# Patient Record
Sex: MALE | Race: WHITE | NOT HISPANIC OR LATINO | Employment: UNEMPLOYED | URBAN - METROPOLITAN AREA
[De-identification: names, ages, dates, MRNs, and addresses within clinical notes are randomized per-mention and may not be internally consistent; named-entity substitution may affect disease eponyms.]

---

## 2017-03-19 ENCOUNTER — OFFICE VISIT (OUTPATIENT)
Dept: URGENT CARE | Facility: PHYSICIAN GROUP | Age: 22
End: 2017-03-19
Payer: COMMERCIAL

## 2017-03-19 VITALS
HEART RATE: 72 BPM | HEIGHT: 68 IN | SYSTOLIC BLOOD PRESSURE: 110 MMHG | DIASTOLIC BLOOD PRESSURE: 70 MMHG | TEMPERATURE: 98.9 F | BODY MASS INDEX: 18.94 KG/M2 | OXYGEN SATURATION: 100 % | WEIGHT: 125 LBS

## 2017-03-19 DIAGNOSIS — Z56.6 STRESSFUL JOB: ICD-10-CM

## 2017-03-19 DIAGNOSIS — F15.10 METHAMPHETAMINE ABUSE (HCC): ICD-10-CM

## 2017-03-19 DIAGNOSIS — R11.14 BILIOUS VOMITING WITH NAUSEA: Primary | ICD-10-CM

## 2017-03-19 PROCEDURE — 99204 OFFICE O/P NEW MOD 45 MIN: CPT | Performed by: PHYSICIAN ASSISTANT

## 2017-03-19 RX ORDER — PROMETHAZINE HYDROCHLORIDE 25 MG/1
25 TABLET ORAL EVERY 6 HOURS PRN
Qty: 30 TAB | Refills: 0 | Status: SHIPPED | OUTPATIENT
Start: 2017-03-19 | End: 2022-12-29

## 2017-03-19 SDOH — HEALTH STABILITY - MENTAL HEALTH: OTHER PHYSICAL AND MENTAL STRAIN RELATED TO WORK: Z56.6

## 2017-03-19 ASSESSMENT — ENCOUNTER SYMPTOMS: STRESS: 1

## 2017-03-19 NOTE — MR AVS SNAPSHOT
"        Joshua Duron   3/19/2017 3:50 PM   Office Visit   MRN: [id number]    Department:  [facility]   Dept Phone:  [phone]    Description:  Male : 1995   Provider:  Juan Carlos Prakash PA-C           Reason for Visit     Stress           Allergies as of 3/19/2017     No Known Allergies      You were diagnosed with     Bilious vomiting with nausea   [1727137]  -  Primary     Stressful job   [324831]       Methamphetamine abuse   [909023]         Vital Signs     Blood Pressure Pulse Temperature Height Weight Body Mass Index    110/70 mmHg 72 37.2 °C (98.9 °F) 1.727 m (5' 8\") 56.7 kg (125 lb) 19.01 kg/m2    Oxygen Saturation                   100%           Basic Information     Date Of Birth Sex Race Ethnicity Preferred Language    1995 Male White Non- English      Health Maintenance     Patient has no pending health maintenance at this time      Current Immunizations     No immunizations on file.      Below and/or attached are the medications your provider expects you to take. Review all of your home medications and newly ordered medications with your provider and/or pharmacist. Follow medication instructions as directed by your provider and/or pharmacist. Please keep your medication list with you and share with your provider. Update the information when medications are discontinued, doses are changed, or new medications (including over-the-counter products) are added; and carry medication information at all times in the event of emergency situations     Allergies:  No Known Allergies          Medications  Valid as of: 2017 -  4:12 PM    Generic Name Brand Name Tablet Size Instructions for use    Promethazine HCl (Tab) PHENERGAN 25 MG Take 1 Tab by mouth every 6 hours as needed for Nausea/Vomiting.        .                 Medicines prescribed today were sent to:     [id number] - [place], [place] - [address]    [address] [place] [address]   " "Phone: [phone] Fax: [phone]    Open 24 Hours?: No      Medication refill instructions:       If your prescription bottle indicates you have medication refills left, it is not necessary to call your provider’s office. Please contact your pharmacy and they will refill your medication.    If your prescription bottle indicates you do not have any refills left, you may request refills at any time through one of the following ways: The online [employer] system (except Urgent Care), by calling your provider’s office, or by asking your pharmacy to contact your provider’s office with a refill request. Medication refills are processed only during regular business hours and may not be available until the next business day. Your provider may request additional information or to have a follow-up visit with you prior to refilling your medication.   *Please Note: Medication refills are assigned a new Rx number when refilled electronically. Your pharmacy may indicate that no refills were authorized even though a new prescription for the same medication is available at the pharmacy. Please request the medicine by name with the pharmacy before contacting your provider for a refill.        Referral     A referral request has been sent to our patient care coordination department. Please allow 3-5 business days for us to process this request and contact you either by phone or mail. If you do not hear from us by the 5th business day, please call us at [phone].        Instructions    Stress  Stress-related medical problems are becoming increasingly common.  The body has a built-in physical response to stressful situations. Faced with pressure, challenge or danger, we need to react quickly. Our bodies release hormones such as cortisol and adrenaline to help do this. These hormones are part of the \"fight or flight\" response and affect the metabolic rate, heart rate and blood pressure, resulting in a heightened, stressed state " that prepares the body for optimum performance in dealing with a stressful situation.  It is likely that early man required these mechanisms to stay alive, but usually modern stresses do not call for this, and the same hormones released in today's world can damage health and reduce coping ability.  CAUSES  · Pressure to perform at work, at school or in sports.  · Threats of physical violence.  · Money worries.  · Arguments.  · Family conflicts.  · Divorce or separation from significant other.  · Bereavement.  · New job or unemployment.  · Changes in location.  · Alcohol or drug abuse.  SOMETIMES, THERE IS NO PARTICULAR REASON FOR DEVELOPING STRESS.  Almost all people are at risk of being stressed at some time in their lives. It is important to know that some stress is temporary and some is long term.  · Temporary stress will go away when a situation is resolved. Most people can cope with short periods of stress, and it can often be relieved by relaxing, taking a walk, chatting through issues with friends, or having a good night's sleep.  · Chronic (long-term, continuous) stress is much harder to deal with. It can be psychologically and emotionally damaging. It can be harmful both for an individual and for friends and family.  SYMPTOMS  Everyone reacts to stress differently. There are some common effects that help us recognize it. In times of extreme stress, people may:  · Shake uncontrollably.  · Breathe faster and deeper than normal (hyperventilate).  · Vomit.  · For people with asthma, stress can trigger an attack.  · For some people, stress may trigger migraine headaches, ulcers, and body pain.  PHYSICAL EFFECTS OF STRESS MAY INCLUDE:  · Loss of energy.  · Skin problems.  · Aches and pains resulting from tense muscles, including neck ache, backache and tension headaches.  · Increased pain from arthritis and other conditions.  · Irregular heart beat (palpitations).  · Periods of irritability or anger.  · Apathy or  depression.  · Anxiety (feeling uptight or worrying).  · Unusual behavior.  · Loss of appetite.  · Comfort eating.  · Lack of concentration.  · Loss of, or decreased, sex-drive.  · Increased smoking, drinking, or recreational drug use.  · For women, missed periods.  · Ulcers, joint pain, and muscle pain.  Post-traumatic stress is the stress caused by any serious accident, strong emotional damage, or extremely difficult or violent experience such as rape or war.  Post-traumatic stress victims can experience mixtures of emotions such as fear, shame, depression, guilt or anger. It may include recurrent memories or images that may be haunting. These feelings can last for weeks, months or even years after the traumatic event that triggered them. Specialized treatment, possibly with medicines and psychological therapies, is available.  If stress is causing physical symptoms, severe distress or making it difficult for you to function as normal, it is worth seeing your caregiver. It is important to remember that although stress is a usual part of life, extreme or prolonged stress can lead to other illnesses that will need treatment. It is better to visit a doctor sooner rather than later. Stress has been linked to the development of high blood pressure and heart disease, as well as insomnia and depression.  There is no diagnostic test for stress since everyone reacts to it differently. But a caregiver will be able to spot the physical symptoms, such as:  · Headaches.  · Shingles.  · Ulcers.  Emotional distress such as intense worry, low mood or irritability should be detected when the doctor asks pertinent questions to identify any underlying problems that might be the cause. In case there are physical reasons for the symptoms, the doctor may also want to do some tests to exclude certain conditions.  If you feel that you are suffering from stress, try to identify the aspects of your life that are causing it. Sometimes you  may not be able to change or avoid them, but even a small change can have a positive ripple effect. A simple lifestyle change can make all the difference.  STRATEGIES THAT CAN HELP DEAL WITH STRESS:  · Delegating or sharing responsibilities.  · Avoiding confrontations.  · Learning to be more assertive.  · Regular exercise.  · Avoid using alcohol or street drugs to cope.  · Eating a healthy, balanced diet, rich in fruit and vegetables and proteins.  · Finding humor or absurdity in stressful situations.  · Never taking on more than you know you can handle comfortably.  · Organizing your time better to get as much done as possible.  · Talking to friends or family and sharing your thoughts and fears.  · Listening to music or relaxation tapes.  · Tensing and then relaxing your muscles, starting at the toes and working up to the head and neck.  If you think that you would benefit from help, either in identifying the things that are causing your stress or in learning techniques to help you relax, see a caregiver who is capable of helping you with this. Rather than relying on medications, it is usually better to try and identify the things in your life that are causing stress and try to deal with them.  There are many techniques of managing stress including counseling, psychotherapy, aromatherapy, yoga, and exercise. Your caregiver can help you determine what is best for you.  Document Released: 03/09/2004 Document Revised: 03/11/2013 Document Reviewed: 02/03/2009  ExitCare® Patient Information ©2014 [employer].            Bolster Access Code: [email or web address]  Expires: 4/18/2017  4:05 PM    Your email address is not on file at [facility].  Email Addresses are required for you to sign up for Bolster, please contact [phone] to verify your personal information and to provide your email address prior to attempting to register for Bolster.    Joshua Duron  [address] [place] [address]    Nova  A  secure, online tool to manage your health information     [employer]’s NTQ-Data® is a secure, online tool that connects you to your personalized health information from the privacy of your home -- day or night - making it very easy for you to manage your healthcare. Once the activation process is completed, you can even access your medical information using the Love[employer] macy, which is available for free in the Apple Macy store or Google Play store.     To learn more about NTQ-Data, visit [email or web address]    There are two levels of access available (as shown below):   My Chart Features  [facility] Primary Care Doctor [facility]  Specialists [facility]  Urgent  Care Non-[facility] Primary Care Doctor   Email your healthcare team securely and privately 24/7 X X X    Manage appointments: schedule your next appointment; view details of past/upcoming appointments X      Request prescription refills. X      View recent personal medical records, including lab and immunizations X X X X   View health record, including health history, allergies, medications X X X X   Read reports about your outpatient visits, procedures, consult and ER notes X X X X   See your discharge summary, which is a recap of your hospital and/or ER visit that includes your diagnosis, lab results, and care plan X X  X     How to register for NTQ-Data:  Once your e-mail address has been verified, follow the following steps to sign up for NTQ-Data.     1. Go to  [email or web address]  2. Click on the Sign Up Now box, which takes you to the New Member Sign Up page. You will need to provide the following information:  a. Enter your NTQ-Data Access Code exactly as it appears at the top of this page. (You will not need to use this code after you’ve completed the sign-up process. If you do not sign up before the expiration date, you must request a new code.)   b. Enter your date of birth.   c. Enter your home email address.   d. Click Submit, and follow the next screen’s  instructions.  3. Create a [employer] ID. This will be your [employer] login ID and cannot be changed, so think of one that is secure and easy to remember.  4. Create a [employer] password. You can change your password at any time.  5. Enter your Password Reset Question and Answer. This can be used at a later time if you forget your password.   6. Enter your e-mail address. This allows you to receive e-mail notifications when new information is available in [employer].  7. Click Sign Up. You can now view your health information.    For assistance activating your [employer] account, call [phone]

## 2017-03-19 NOTE — PATIENT INSTRUCTIONS
"Stress  Stress-related medical problems are becoming increasingly common.  The body has a built-in physical response to stressful situations. Faced with pressure, challenge or danger, we need to react quickly. Our bodies release hormones such as cortisol and adrenaline to help do this. These hormones are part of the \"fight or flight\" response and affect the metabolic rate, heart rate and blood pressure, resulting in a heightened, stressed state that prepares the body for optimum performance in dealing with a stressful situation.  It is likely that early man required these mechanisms to stay alive, but usually modern stresses do not call for this, and the same hormones released in today's world can damage health and reduce coping ability.  CAUSES  · Pressure to perform at work, at school or in sports.  · Threats of physical violence.  · Money worries.  · Arguments.  · Family conflicts.  · Divorce or separation from significant other.  · Bereavement.  · New job or unemployment.  · Changes in location.  · Alcohol or drug abuse.  SOMETIMES, THERE IS NO PARTICULAR REASON FOR DEVELOPING STRESS.  Almost all people are at risk of being stressed at some time in their lives. It is important to know that some stress is temporary and some is long term.  · Temporary stress will go away when a situation is resolved. Most people can cope with short periods of stress, and it can often be relieved by relaxing, taking a walk, chatting through issues with friends, or having a good night's sleep.  · Chronic (long-term, continuous) stress is much harder to deal with. It can be psychologically and emotionally damaging. It can be harmful both for an individual and for friends and family.  SYMPTOMS  Everyone reacts to stress differently. There are some common effects that help us recognize it. In times of extreme stress, people may:  · Shake uncontrollably.  · Breathe faster and deeper than normal (hyperventilate).  · Vomit.  · For people " with asthma, stress can trigger an attack.  · For some people, stress may trigger migraine headaches, ulcers, and body pain.  PHYSICAL EFFECTS OF STRESS MAY INCLUDE:  · Loss of energy.  · Skin problems.  · Aches and pains resulting from tense muscles, including neck ache, backache and tension headaches.  · Increased pain from arthritis and other conditions.  · Irregular heart beat (palpitations).  · Periods of irritability or anger.  · Apathy or depression.  · Anxiety (feeling uptight or worrying).  · Unusual behavior.  · Loss of appetite.  · Comfort eating.  · Lack of concentration.  · Loss of, or decreased, sex-drive.  · Increased smoking, drinking, or recreational drug use.  · For women, missed periods.  · Ulcers, joint pain, and muscle pain.  Post-traumatic stress is the stress caused by any serious accident, strong emotional damage, or extremely difficult or violent experience such as rape or war.  Post-traumatic stress victims can experience mixtures of emotions such as fear, shame, depression, guilt or anger. It may include recurrent memories or images that may be haunting. These feelings can last for weeks, months or even years after the traumatic event that triggered them. Specialized treatment, possibly with medicines and psychological therapies, is available.  If stress is causing physical symptoms, severe distress or making it difficult for you to function as normal, it is worth seeing your caregiver. It is important to remember that although stress is a usual part of life, extreme or prolonged stress can lead to other illnesses that will need treatment. It is better to visit a doctor sooner rather than later. Stress has been linked to the development of high blood pressure and heart disease, as well as insomnia and depression.  There is no diagnostic test for stress since everyone reacts to it differently. But a caregiver will be able to spot the physical symptoms, such  as:  · Headaches.  · Shingles.  · Ulcers.  Emotional distress such as intense worry, low mood or irritability should be detected when the doctor asks pertinent questions to identify any underlying problems that might be the cause. In case there are physical reasons for the symptoms, the doctor may also want to do some tests to exclude certain conditions.  If you feel that you are suffering from stress, try to identify the aspects of your life that are causing it. Sometimes you may not be able to change or avoid them, but even a small change can have a positive ripple effect. A simple lifestyle change can make all the difference.  STRATEGIES THAT CAN HELP DEAL WITH STRESS:  · Delegating or sharing responsibilities.  · Avoiding confrontations.  · Learning to be more assertive.  · Regular exercise.  · Avoid using alcohol or street drugs to cope.  · Eating a healthy, balanced diet, rich in fruit and vegetables and proteins.  · Finding humor or absurdity in stressful situations.  · Never taking on more than you know you can handle comfortably.  · Organizing your time better to get as much done as possible.  · Talking to friends or family and sharing your thoughts and fears.  · Listening to music or relaxation tapes.  · Tensing and then relaxing your muscles, starting at the toes and working up to the head and neck.  If you think that you would benefit from help, either in identifying the things that are causing your stress or in learning techniques to help you relax, see a caregiver who is capable of helping you with this. Rather than relying on medications, it is usually better to try and identify the things in your life that are causing stress and try to deal with them.  There are many techniques of managing stress including counseling, psychotherapy, aromatherapy, yoga, and exercise. Your caregiver can help you determine what is best for you.  Document Released: 03/09/2004 Document Revised: 03/11/2013 Document  Reviewed: 02/03/2009  ExitCare® Patient Information ©2014 [employer].

## 2017-03-20 NOTE — PROGRESS NOTES
Subjective:      Pt is a 21 y.o. 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 Pt states he abuses METH to cope and took 325 mg this morning with a whole bottle of Elizabeth Quick chocolate syrup and has had NV. Pt has not taken any Rx medications for this condition. Pt states the pain is a 7/10, aching in nature and worse at night. Pt denies CP, SOB, diarrhea, paresthesias, headaches, dizziness, change in vision, hives, or other joint pain. The pt's medication list, problem list, and allergies have been evaluated and reviewed during today's visit.    PMH:  Negative per pt.      PSH:  Negative per pt.      Fam Hx:  Father with hx of HTN      Soc HX:  Social History     Social History   • Marital Status: Single     Spouse Name: N/A   • Number of Children: N/A   • Years of Education: N/A     Occupational History   • Not on file.     Social History Main Topics   • Smoking status: Not on file   • Smokeless tobacco: Not on file   • Alcohol Use: Not on file   • Drug Use: Not on file   • Sexual Activity: Not on file     Other Topics Concern   • Not on file     Social History Narrative   • No narrative on file         Medications:    Current outpatient prescriptions:   •  promethazine (PHENERGAN) 25 MG Tab, Take 1 Tab by mouth every 6 hours as needed for Nausea/Vomiting., Disp: 30 Tab, Rfl: 0      Allergies:  Review of patient's allergies indicates no known allergies.        ROS    Constitutional: Negative for fever, chills and malaise/fatigue. +Meth abuse  HENT: Negative for congestion and sore throat.    Eyes: Negative for blurred vision, double vision and photophobia.   Respiratory: Negative for cough and shortness of breath.    Cardiovascular: Negative for chest pain and palpitations.   Gastrointestinal: POS nausea and vomiting, NEG for  abdominal pain,  "diarrhea and constipation.   Genitourinary: Negative for dysuria and flank pain.   Musculoskeletal: Negative for joint pain and myalgias.   Skin: Negative for itching and rash.   Neurological: Negative for dizziness, tingling and headaches.   Endo/Heme/Allergies: Does not bruise/bleed easily.   Psychiatric/Behavioral: Negative for depression. The patient is not nervous/anxious.         Objective:     /70 mmHg  Pulse 72  Temp(Src) 37.2 °C (98.9 °F)  Ht 1.727 m (5' 8\")  Wt 56.7 kg (125 lb)  BMI 19.01 kg/m2  SpO2 100%     Physical Exam      Constitutional: PT is oriented to person, place, and time. PT appears well-developed and well-nourished. No distress.   HENT:   Head: Normocephalic and atraumatic.   Mouth/Throat: Oropharynx is clear and moist. No oropharyngeal exudate.   Eyes: Conjunctivae normal and EOM are normal. Pupils are equal, round, and reactive to light.   Neck: Normal range of motion. Neck supple. No thyromegaly present.   Cardiovascular: Normal rate, regular rhythm, normal heart sounds and intact distal pulses.  Exam reveals no gallop and no friction rub.    No murmur heard.  Pulmonary/Chest: Effort normal and breath sounds normal. No respiratory distress. PT has no wheezes. PT has no rales. Pt exhibits no tenderness.   Abdominal: Soft. Bowel sounds are normal. PT exhibits no distension and no mass. There is no tenderness. There is no rebound and no guarding.   Musculoskeletal: Normal range of motion. PT exhibits no edema and no tenderness.   Neurological: PT is alert and oriented to person, place, and time. PT has normal reflexes. No cranial nerve deficit.   Skin: Skin is warm and dry. No rash noted. PT is not diaphoretic. No erythema.       Psychiatric: PT has a normal mood and affect. PT behavior is normal. Judgment and thought content normal.          Assessment/Plan:     1. Bilious vomiting with nausea    - promethazine (PHENERGAN) 25 MG Tab; Take 1 Tab by mouth every 6 hours as needed " for Nausea/Vomiting.  Dispense: 30 Tab; Refill: 0    2. Stressful job    - REFERRAL TO PSYCHIATRY    3. Methamphetamine abuse    - REFERRAL TO PSYCHIATRY    Crisis hotline number given to the pt  Counseling discussed and pt accepts referral  Pt defers ER but understands precautions  Ceasing the street drugs discussed at length  Rest, fluids encouraged.  AVS with medical info given.  Pt was in full understanding and agreement with the plan.  Follow-up as needed if symptoms worsen or fail to improve.

## 2021-08-22 ENCOUNTER — OFFICE VISIT (OUTPATIENT)
Dept: URGENT CARE | Facility: PHYSICIAN GROUP | Age: 26
End: 2021-08-22

## 2021-08-22 VITALS
OXYGEN SATURATION: 97 % | WEIGHT: 136 LBS | BODY MASS INDEX: 19.04 KG/M2 | TEMPERATURE: 98.4 F | RESPIRATION RATE: 20 BRPM | SYSTOLIC BLOOD PRESSURE: 110 MMHG | HEIGHT: 71 IN | DIASTOLIC BLOOD PRESSURE: 72 MMHG | HEART RATE: 90 BPM

## 2021-08-22 DIAGNOSIS — T63.301A SPIDER BITE WOUND, ACCIDENTAL OR UNINTENTIONAL, INITIAL ENCOUNTER: ICD-10-CM

## 2021-08-22 DIAGNOSIS — H10.9 CONJUNCTIVITIS OF LEFT EYE, UNSPECIFIED CONJUNCTIVITIS TYPE: Primary | ICD-10-CM

## 2021-08-22 PROCEDURE — 99203 OFFICE O/P NEW LOW 30 MIN: CPT | Performed by: PHYSICIAN ASSISTANT

## 2021-08-22 RX ORDER — POLYMYXIN B SULFATE AND TRIMETHOPRIM 1; 10000 MG/ML; [USP'U]/ML
1 SOLUTION OPHTHALMIC 4 TIMES DAILY
Qty: 10 ML | Refills: 0 | Status: SHIPPED | OUTPATIENT
Start: 2021-08-22 | End: 2021-08-27

## 2021-08-22 RX ORDER — POLYMYXIN B SULFATE AND TRIMETHOPRIM 1; 10000 MG/ML; [USP'U]/ML
1 SOLUTION OPHTHALMIC 4 TIMES DAILY
Qty: 10 ML | Refills: 0 | Status: SHIPPED | OUTPATIENT
Start: 2021-08-22 | End: 2021-08-22

## 2021-08-22 ASSESSMENT — VISUAL ACUITY
OD_CC: 20/25
OS_CC: 20/50

## 2021-08-30 ASSESSMENT — ENCOUNTER SYMPTOMS
VISUAL CHANGE: 0
EYE DISCHARGE: 1
PHOTOPHOBIA: 1
EYE PAIN: 1
FEVER: 0
DOUBLE VISION: 0
BLURRED VISION: 0
EYE REDNESS: 1

## 2021-08-30 ASSESSMENT — VISUAL ACUITY: OU: 1

## 2021-08-30 NOTE — PROGRESS NOTES
"Subjective     Joshua Duron is a 26 y.o. male who presents with Eye Drainage (left eye light sensitive and red x 1 day)            Patient presents with:  Eye Drainage: left eye light sensitive and red x 1 day  No vision changes.  PT wears contact lenses.  Pt also got spider bite on right foot, itchy but not draining, no fever, chills, swelling.      Eye Drainage  This is a new problem. The current episode started today. The problem occurs constantly. The problem has been gradually worsening. Pertinent negatives include no congestion, fever or visual change. Nothing aggravates the symptoms. Treatments tried: eye drops. The treatment provided no relief.       Review of Systems   Constitutional: Negative for fever.   HENT: Negative for congestion.    Eyes: Positive for photophobia, pain, discharge and redness. Negative for blurred vision and double vision.   All other systems reviewed and are negative.             Objective     /72 (BP Location: Left arm, Patient Position: Sitting, BP Cuff Size: Adult)   Pulse 90   Temp 36.9 °C (98.4 °F) (Temporal)   Resp 20   Ht 1.791 m (5' 10.5\")   Wt 61.7 kg (136 lb)   SpO2 97%   BMI 19.24 kg/m²      Physical Exam  Vitals and nursing note reviewed.   Constitutional:       General: He is not in acute distress.     Appearance: Normal appearance. He is well-developed and normal weight. He is not ill-appearing or toxic-appearing.   HENT:      Head: Normocephalic and atraumatic.      Nose: Nose normal.      Mouth/Throat:      Lips: Pink.      Mouth: Mucous membranes are moist.   Eyes:      General: Lids are normal. Lids are everted, no foreign bodies appreciated. Vision grossly intact. Gaze aligned appropriately. No scleral icterus.        Left eye: Discharge present.     Conjunctiva/sclera:      Left eye: Left conjunctiva is injected. No chemosis or exudate.     Pupils: Pupils are equal, round, and reactive to light.      Comments: Deferred fluorescein stain " exam.    Neck:      Vascular: No JVD.   Cardiovascular:      Rate and Rhythm: Normal rate and regular rhythm.      Heart sounds: Normal heart sounds.   Pulmonary:      Effort: Pulmonary effort is normal.      Breath sounds: Normal breath sounds.   Abdominal:      Palpations: Abdomen is soft.   Musculoskeletal:         General: Normal range of motion.      Cervical back: Normal range of motion and neck supple.   Lymphadenopathy:      Cervical: No cervical adenopathy.   Skin:     General: Skin is warm and dry.      Capillary Refill: Capillary refill takes less than 2 seconds.          Neurological:      Mental Status: He is alert and oriented to person, place, and time.      Gait: Gait normal.   Psychiatric:         Mood and Affect: Mood normal.         Behavior: Behavior is cooperative.                             Assessment & Plan        1. Conjunctivitis of left eye, unspecified conjunctivitis type  polymixin-trimethoprim (POLYTRIM) 00515-9.1 UNIT/ML-% Solution        2. Spider bite wound, accidental or unintentional, initial encounter               Patient was evaluated in clinic today while wearing appropriate personal protective equipment.      Pt instructed to refrain from wearing contacts for one week.  Discard current pair.    Wear glasses while using drops.       PT should follow up with PCP in 1-2 days for re-evaluation if symptoms have not improved.      Discussed red flags and reasons to return to UC or ED.      Pt and/or family verbalized understanding of diagnosis and follow up instructions and was offered informational handout on diagnosis.  PT discharged.

## 2022-12-29 ENCOUNTER — HOSPITAL ENCOUNTER (OUTPATIENT)
Dept: LAB | Facility: MEDICAL CENTER | Age: 27
End: 2022-12-29
Attending: FAMILY MEDICINE

## 2022-12-29 ENCOUNTER — OFFICE VISIT (OUTPATIENT)
Dept: URGENT CARE | Facility: PHYSICIAN GROUP | Age: 27
End: 2022-12-29

## 2022-12-29 VITALS
RESPIRATION RATE: 16 BRPM | TEMPERATURE: 98.4 F | OXYGEN SATURATION: 96 % | DIASTOLIC BLOOD PRESSURE: 72 MMHG | SYSTOLIC BLOOD PRESSURE: 112 MMHG | HEART RATE: 54 BPM | WEIGHT: 185 LBS | HEIGHT: 70 IN | BODY MASS INDEX: 26.48 KG/M2

## 2022-12-29 DIAGNOSIS — R42 DIZZINESS: ICD-10-CM

## 2022-12-29 DIAGNOSIS — R74.8 ELEVATED ALKALINE PHOSPHATASE LEVEL: ICD-10-CM

## 2022-12-29 DIAGNOSIS — D72.829 LEUKOCYTOSIS, UNSPECIFIED TYPE: ICD-10-CM

## 2022-12-29 LAB
ALBUMIN SERPL BCP-MCNC: 4.6 G/DL (ref 3.2–4.9)
ALBUMIN/GLOB SERPL: 1.7 G/DL
ALP SERPL-CCNC: 115 U/L (ref 30–99)
ALT SERPL-CCNC: 32 U/L (ref 2–50)
ANION GAP SERPL CALC-SCNC: 15 MMOL/L (ref 7–16)
AST SERPL-CCNC: 45 U/L (ref 12–45)
BASOPHILS # BLD AUTO: 0.2 % (ref 0–1.8)
BASOPHILS # BLD: 0.03 K/UL (ref 0–0.12)
BILIRUB SERPL-MCNC: 0.8 MG/DL (ref 0.1–1.5)
BUN SERPL-MCNC: 12 MG/DL (ref 8–22)
CALCIUM ALBUM COR SERPL-MCNC: 8.9 MG/DL (ref 8.5–10.5)
CALCIUM SERPL-MCNC: 9.4 MG/DL (ref 8.5–10.5)
CHLORIDE SERPL-SCNC: 108 MMOL/L (ref 96–112)
CO2 SERPL-SCNC: 21 MMOL/L (ref 20–33)
CREAT SERPL-MCNC: 0.64 MG/DL (ref 0.5–1.4)
EOSINOPHIL # BLD AUTO: 0.06 K/UL (ref 0–0.51)
EOSINOPHIL NFR BLD: 0.5 % (ref 0–6.9)
ERYTHROCYTE [DISTWIDTH] IN BLOOD BY AUTOMATED COUNT: 39.9 FL (ref 35.9–50)
GFR SERPLBLD CREATININE-BSD FMLA CKD-EPI: 133 ML/MIN/1.73 M 2
GLOBULIN SER CALC-MCNC: 2.7 G/DL (ref 1.9–3.5)
GLUCOSE BLD-MCNC: 95 MG/DL (ref 70–100)
GLUCOSE SERPL-MCNC: 103 MG/DL (ref 65–99)
HCT VFR BLD AUTO: 45.6 % (ref 42–52)
HGB BLD-MCNC: 15.9 G/DL (ref 14–18)
IMM GRANULOCYTES # BLD AUTO: 0.04 K/UL (ref 0–0.11)
IMM GRANULOCYTES NFR BLD AUTO: 0.3 % (ref 0–0.9)
LYMPHOCYTES # BLD AUTO: 1.24 K/UL (ref 1–4.8)
LYMPHOCYTES NFR BLD: 9.7 % (ref 22–41)
MCH RBC QN AUTO: 29.6 PG (ref 27–33)
MCHC RBC AUTO-ENTMCNC: 34.9 G/DL (ref 33.7–35.3)
MCV RBC AUTO: 84.8 FL (ref 81.4–97.8)
MONOCYTES # BLD AUTO: 0.48 K/UL (ref 0–0.85)
MONOCYTES NFR BLD AUTO: 3.7 % (ref 0–13.4)
NEUTROPHILS # BLD AUTO: 10.96 K/UL (ref 1.82–7.42)
NEUTROPHILS NFR BLD: 85.6 % (ref 44–72)
NRBC # BLD AUTO: 0 K/UL
NRBC BLD-RTO: 0 /100 WBC
PLATELET # BLD AUTO: 275 K/UL (ref 164–446)
PMV BLD AUTO: 10.1 FL (ref 9–12.9)
POTASSIUM SERPL-SCNC: 3.9 MMOL/L (ref 3.6–5.5)
PROT SERPL-MCNC: 7.3 G/DL (ref 6–8.2)
RBC # BLD AUTO: 5.38 M/UL (ref 4.7–6.1)
SODIUM SERPL-SCNC: 144 MMOL/L (ref 135–145)
WBC # BLD AUTO: 12.8 K/UL (ref 4.8–10.8)

## 2022-12-29 PROCEDURE — 99215 OFFICE O/P EST HI 40 MIN: CPT | Performed by: FAMILY MEDICINE

## 2022-12-29 PROCEDURE — 80053 COMPREHEN METABOLIC PANEL: CPT

## 2022-12-29 PROCEDURE — 82962 GLUCOSE BLOOD TEST: CPT | Performed by: FAMILY MEDICINE

## 2022-12-29 PROCEDURE — 93000 ELECTROCARDIOGRAM COMPLETE: CPT | Performed by: FAMILY MEDICINE

## 2022-12-29 PROCEDURE — 36415 COLL VENOUS BLD VENIPUNCTURE: CPT

## 2022-12-29 PROCEDURE — 85025 COMPLETE CBC W/AUTO DIFF WBC: CPT

## 2022-12-29 RX ORDER — MECLIZINE HYDROCHLORIDE 25 MG/1
25 TABLET ORAL 3 TIMES DAILY PRN
Qty: 30 TABLET | Refills: 0 | Status: SHIPPED | OUTPATIENT
Start: 2022-12-29

## 2022-12-29 NOTE — PROGRESS NOTES
"Chief Complaint   Patient presents with    Dizziness    Other     Feeling faint                        Patient complains of dizziness and lightheaded for 3 days.   Symptoms are unchanged since onset.   Symptoms are intermittent and describes feeling of dysequilibrium, unsteady on feet.  + mild bitemporal headaches.   + hx of migraines.  Denies n/v/d.   No fever.      + near syncope, but denies any actual LOC       Dizziness is worse with  getting up from supine position.    No double vision, hearing loss, numbness, nausea, vomiting, headache, slurred speech or shortness of breath.   Denies depression, anxiety.    No past medical history on file.    Social History     Tobacco Use    Smoking status: Some Days    Smokeless tobacco: Never   Vaping Use    Vaping Use: Never used   Substance Use Topics    Alcohol use: Not Currently    Drug use: Yes     Types: Marijuana            Review of Systems   Constitutional: Negative for fever.   Respiratory: Negative for shortness of breath.    Cardiovascular: Negative for chest pain.   GI - no diarrhea  Neurological: Positive for dizziness. Negative for headaches.   All other systems reviewed and are negative.         Objective:     /72 (BP Location: Left arm, Patient Position: Sitting, BP Cuff Size: Adult)   Pulse (!) 54   Temp 36.9 °C (98.4 °F) (Temporal)   Resp 16   Ht 1.778 m (5' 10\")   Wt 83.9 kg (185 lb)   SpO2 96%       Physical Exam   Constitutional: She is oriented to person, place, and time. She appears well-developed and well-nourished. No distress.   HENT:   Head: Normocephalic and atraumatic. EOMI.  PERRLA.  No nystagmus  Mouth/Throat: No oropharyngeal exudate.   TMs normal   Eyes: Conjunctivae are normal.   Cardiovascular: Normal rate, regular rhythm and normal heart sounds.    Pulmonary/Chest: Effort normal and breath sounds normal. No respiratory distress. Pt has no wheezes, rales.   Neurological: pt is alert and oriented to person, place, and time. No " cranial nerve deficit. Coordination and gait normal.   Skin: Skin is warm. She is not diaphoretic. No erythema.   Psychiatric:  behavior is normal.   Nursing note and vitals reviewed.         Hospital Outpatient Visit on 12/29/2022   Component Date Value Ref Range Status    Sodium 12/29/2022 144  135 - 145 mmol/L Final    Potassium 12/29/2022 3.9  3.6 - 5.5 mmol/L Final    Chloride 12/29/2022 108  96 - 112 mmol/L Final    Co2 12/29/2022 21  20 - 33 mmol/L Final    Anion Gap 12/29/2022 15.0  7.0 - 16.0 Final    Glucose 12/29/2022 103 (H)  65 - 99 mg/dL Final    Bun 12/29/2022 12  8 - 22 mg/dL Final    Creatinine 12/29/2022 0.64  0.50 - 1.40 mg/dL Final    Calcium 12/29/2022 9.4  8.5 - 10.5 mg/dL Final    AST(SGOT) 12/29/2022 45  12 - 45 U/L Final    ALT(SGPT) 12/29/2022 32  2 - 50 U/L Final    Alkaline Phosphatase 12/29/2022 115 (H)  30 - 99 U/L Final    Total Bilirubin 12/29/2022 0.8  0.1 - 1.5 mg/dL Final    Albumin 12/29/2022 4.6  3.2 - 4.9 g/dL Final    Total Protein 12/29/2022 7.3  6.0 - 8.2 g/dL Final    Globulin 12/29/2022 2.7  1.9 - 3.5 g/dL Final    A-G Ratio 12/29/2022 1.7  g/dL Final    WBC 12/29/2022 12.8 (H)  4.8 - 10.8 K/uL Final    RBC 12/29/2022 5.38  4.70 - 6.10 M/uL Final    Hemoglobin 12/29/2022 15.9  14.0 - 18.0 g/dL Final    Hematocrit 12/29/2022 45.6  42.0 - 52.0 % Final    MCV 12/29/2022 84.8  81.4 - 97.8 fL Final    MCH 12/29/2022 29.6  27.0 - 33.0 pg Final    MCHC 12/29/2022 34.9  33.7 - 35.3 g/dL Final    RDW 12/29/2022 39.9  35.9 - 50.0 fL Final    Platelet Count 12/29/2022 275  164 - 446 K/uL Final    MPV 12/29/2022 10.1  9.0 - 12.9 fL Final    Neutrophils-Polys 12/29/2022 85.60 (H)  44.00 - 72.00 % Final    Lymphocytes 12/29/2022 9.70 (L)  22.00 - 41.00 % Final    Monocytes 12/29/2022 3.70  0.00 - 13.40 % Final    Eosinophils 12/29/2022 0.50  0.00 - 6.90 % Final    Basophils 12/29/2022 0.20  0.00 - 1.80 % Final    Immature Granulocytes 12/29/2022 0.30  0.00 - 0.90 % Final    Nucleated RBC  12/29/2022 0.00  /100 WBC Final    Neutrophils (Absolute) 12/29/2022 10.96 (H)  1.82 - 7.42 K/uL Final    Includes immature neutrophils, if present.    Lymphs (Absolute) 12/29/2022 1.24  1.00 - 4.80 K/uL Final    Monos (Absolute) 12/29/2022 0.48  0.00 - 0.85 K/uL Final    Eos (Absolute) 12/29/2022 0.06  0.00 - 0.51 K/uL Final    Baso (Absolute) 12/29/2022 0.03  0.00 - 0.12 K/uL Final    Immature Granulocytes (abs) 12/29/2022 0.04  0.00 - 0.11 K/uL Final    NRBC (Absolute) 12/29/2022 0.00  K/uL Final    Correct Calcium 12/29/2022 8.9  8.5 - 10.5 mg/dL Final    GFR (CKD-EPI) 12/29/2022 133  >60 mL/min/1.73 m 2 Final    Comment: Estimated Glomerular Filtration Rate is calculated using  race neutral CKD-EPI 2021 equation per [facility]-ASN recommendations.     Office Visit on 12/29/2022   Component Date Value Ref Range Status    Glucose - Accu-Ck 12/29/2022 95  70 - 100 mg/dL Final     EKG interpretation- NSR. HR is .   Axis is positive.   No ST or QRS morphologic changes.  No T-wave inversions.  CA and QT intervals are normal.         Assessment/Plan:          1. Dizziness  Etiology is unclear.     Glucose was normal    EKG reviewed and interpreted by myself  and was unremarkable.     WBC 12.8, with neutrophillia, which suggests bacterial infection, although the source is not obvious at all at this time and pt has been afebrile.        2. Leukocytosis     Cause unclear  Repeat CBC in am.  If WBC trending up, will start empirically on antibiotic.             3. Elevated alkaline phosphatase level  Pt  does not drink alcohol.   No history of gallstones, denies abd pain    AST/ALT wnl      Cause unclear.    Advised f/u PCP    - meclizine (ANTIVERT) 25 MG Tab; Take 1 Tablet by mouth 3 times a day as needed for Dizziness.  Dispense: 30 Tablet; Refill: 0  - CBC WITH DIFFERENTIAL; Future  - Comp Metabolic Panel; Future  - TSH      My total time spent caring for the patient on the day of the encounter was 40 minutes.   This does not  include time spent on separately billable procedures/tests.

## 2022-12-30 ENCOUNTER — HOSPITAL ENCOUNTER (OUTPATIENT)
Dept: LAB | Facility: MEDICAL CENTER | Age: 27
End: 2022-12-30
Attending: FAMILY MEDICINE

## 2022-12-30 DIAGNOSIS — D72.829 LEUKOCYTOSIS, UNSPECIFIED TYPE: ICD-10-CM

## 2022-12-30 LAB
BASOPHILS # BLD AUTO: 0.5 % (ref 0–1.8)
BASOPHILS # BLD: 0.03 K/UL (ref 0–0.12)
EOSINOPHIL # BLD AUTO: 0.27 K/UL (ref 0–0.51)
EOSINOPHIL NFR BLD: 4.4 % (ref 0–6.9)
ERYTHROCYTE [DISTWIDTH] IN BLOOD BY AUTOMATED COUNT: 40.3 FL (ref 35.9–50)
HCT VFR BLD AUTO: 46.2 % (ref 42–52)
HGB BLD-MCNC: 15.9 G/DL (ref 14–18)
IMM GRANULOCYTES # BLD AUTO: 0.01 K/UL (ref 0–0.11)
IMM GRANULOCYTES NFR BLD AUTO: 0.2 % (ref 0–0.9)
LYMPHOCYTES # BLD AUTO: 1.91 K/UL (ref 1–4.8)
LYMPHOCYTES NFR BLD: 31.4 % (ref 22–41)
MCH RBC QN AUTO: 29.6 PG (ref 27–33)
MCHC RBC AUTO-ENTMCNC: 34.4 G/DL (ref 33.7–35.3)
MCV RBC AUTO: 86 FL (ref 81.4–97.8)
MONOCYTES # BLD AUTO: 0.41 K/UL (ref 0–0.85)
MONOCYTES NFR BLD AUTO: 6.7 % (ref 0–13.4)
NEUTROPHILS # BLD AUTO: 3.45 K/UL (ref 1.82–7.42)
NEUTROPHILS NFR BLD: 56.8 % (ref 44–72)
NRBC # BLD AUTO: 0 K/UL
NRBC BLD-RTO: 0 /100 WBC
PLATELET # BLD AUTO: 265 K/UL (ref 164–446)
PMV BLD AUTO: 9.9 FL (ref 9–12.9)
RBC # BLD AUTO: 5.37 M/UL (ref 4.7–6.1)
TSH SERPL DL<=0.005 MIU/L-ACNC: 1.42 UIU/ML (ref 0.38–5.33)
WBC # BLD AUTO: 6.1 K/UL (ref 4.8–10.8)

## 2022-12-30 PROCEDURE — 85025 COMPLETE CBC W/AUTO DIFF WBC: CPT

## 2022-12-30 PROCEDURE — 84443 ASSAY THYROID STIM HORMONE: CPT

## 2022-12-30 PROCEDURE — 36415 COLL VENOUS BLD VENIPUNCTURE: CPT

## 2025-04-27 ENCOUNTER — OFFICE VISIT (OUTPATIENT)
Dept: URGENT CARE | Facility: PHYSICIAN GROUP | Age: 30
End: 2025-04-27
Payer: COMMERCIAL

## 2025-04-27 VITALS
HEART RATE: 78 BPM | RESPIRATION RATE: 14 BRPM | DIASTOLIC BLOOD PRESSURE: 70 MMHG | OXYGEN SATURATION: 95 % | TEMPERATURE: 97.8 F | BODY MASS INDEX: 27.44 KG/M2 | SYSTOLIC BLOOD PRESSURE: 120 MMHG | HEIGHT: 71 IN | WEIGHT: 196 LBS

## 2025-04-27 DIAGNOSIS — J32.9 BACTERIAL SINUSITIS: ICD-10-CM

## 2025-04-27 DIAGNOSIS — B96.89 BACTERIAL SINUSITIS: ICD-10-CM

## 2025-04-27 PROCEDURE — 3078F DIAST BP <80 MM HG: CPT

## 2025-04-27 PROCEDURE — 99213 OFFICE O/P EST LOW 20 MIN: CPT

## 2025-04-27 PROCEDURE — 3074F SYST BP LT 130 MM HG: CPT

## 2025-04-27 NOTE — PROGRESS NOTES
"Chief Complaint   Patient presents with    Headache     Pressure on face/forehead, hot flashes cold sweats. X 12 days.          Subjective:   HISTORY OF PRESENT ILLNESS: Joshua Duron is a 29 y.o. male who presents for sinus pressure and congestion x 12 days.  He states he used all the nasal OTC medications he could for his symptoms and is improving but he continues to wake up in the morning with dry heaves as he tries to lossen up the post nasal drip. He does report headache.    Patient denies fever or cough    Medications, Allergies, current problem list, Social and Family history reviewed today in Epic.     Objective:     /70 (BP Location: Right arm, Patient Position: Sitting, BP Cuff Size: Adult)   Pulse 78   Temp 36.6 °C (97.8 °F) (Temporal)   Resp 14   Ht 1.803 m (5' 11\")   Wt 88.9 kg (196 lb)   SpO2 95%     Physical Exam  Vitals and nursing note reviewed.   Constitutional:       General: He is not in acute distress.     Appearance: He is not ill-appearing.   HENT:      Head: Normocephalic.      Right Ear: Ear canal normal. A middle ear effusion is present. No mastoid tenderness. Tympanic membrane is not erythematous.      Left Ear: Ear canal normal. A middle ear effusion is present. No mastoid tenderness. Tympanic membrane is not erythematous.      Nose: Congestion present.      Right Nostril: No occlusion.      Left Nostril: No occlusion.      Right Turbinates: Swollen.      Left Turbinates: Swollen.      Right Sinus: Maxillary sinus tenderness and frontal sinus tenderness present.      Left Sinus: Maxillary sinus tenderness and frontal sinus tenderness present.      Mouth/Throat:      Mouth: Mucous membranes are moist.      Pharynx: Posterior oropharyngeal erythema present.      Tonsils: 0 on the right. 0 on the left.   Eyes:      General: Lids are normal.   Pulmonary:      Breath sounds: Normal breath sounds.   Skin:     General: Skin is warm.   Neurological:      General: No focal " deficit present.      Mental Status: He is alert.          Assessment/Plan:     Diagnosis and associated orders    I personally reviewed prior external notes and test results pertinent to today's visit.     1. Bacterial sinusitis  amoxicillin-clavulanate (AUGMENTIN) 875-125 MG Tab    DISCONTINUED: amoxicillin-clavulanate (AUGMENTIN) 875-125 MG Tab            IMPRESSION: The patient is well appearing here with reassuring exam and vitals signs. Symptomatology is consistent with bacterial sinusitis given the length of symptoms and double sickening.  Advised to start antibiotics and add Flonase nasal spray and a OTC nasal decongestant.     They are discharged home with a course of augmentin.    Discussed anticipated duration of illness, return to work/school, and indications to RTC or go to the Emergency department.    Patient states understanding of the plan of care and discharge instructions.  They are discharged in stable condition.         Please note that this dictation was created using voice recognition software. I have made a reasonable attempt to correct obvious errors, but I expect that there are errors of grammar and possibly content that I did not discover before finalizing the note.    This note was electronically signed by SEVERINO Silva

## 2025-05-01 ENCOUNTER — OFFICE VISIT (OUTPATIENT)
Dept: URGENT CARE | Facility: PHYSICIAN GROUP | Age: 30
End: 2025-05-01
Payer: COMMERCIAL

## 2025-05-01 VITALS
BODY MASS INDEX: 26.96 KG/M2 | RESPIRATION RATE: 17 BRPM | DIASTOLIC BLOOD PRESSURE: 84 MMHG | WEIGHT: 192.6 LBS | HEIGHT: 71 IN | HEART RATE: 76 BPM | SYSTOLIC BLOOD PRESSURE: 122 MMHG | OXYGEN SATURATION: 96 % | TEMPERATURE: 97.1 F

## 2025-05-01 DIAGNOSIS — J34.89 SINUS PRESSURE: ICD-10-CM

## 2025-05-01 DIAGNOSIS — R11.2 NAUSEA AND VOMITING, UNSPECIFIED VOMITING TYPE: ICD-10-CM

## 2025-05-01 PROCEDURE — 99214 OFFICE O/P EST MOD 30 MIN: CPT | Performed by: PHYSICIAN ASSISTANT

## 2025-05-01 PROCEDURE — 3079F DIAST BP 80-89 MM HG: CPT | Performed by: PHYSICIAN ASSISTANT

## 2025-05-01 PROCEDURE — 3074F SYST BP LT 130 MM HG: CPT | Performed by: PHYSICIAN ASSISTANT

## 2025-05-01 RX ORDER — ONDANSETRON 4 MG/1
4 TABLET, ORALLY DISINTEGRATING ORAL EVERY 8 HOURS PRN
Qty: 10 TABLET | Refills: 0 | Status: SHIPPED | OUTPATIENT
Start: 2025-05-01

## 2025-05-01 ASSESSMENT — ENCOUNTER SYMPTOMS
ABDOMINAL PAIN: 0
DIARRHEA: 0
SINUS PAIN: 1
VOMITING: 1
MYALGIAS: 0
FEVER: 0
EYE DISCHARGE: 0
DIZZINESS: 0
SORE THROAT: 0
NAUSEA: 1
HEADACHES: 1
EYE REDNESS: 0
COUGH: 0
CHILLS: 0

## 2025-05-01 NOTE — PROGRESS NOTES
"Subjective     Joshua Miller Duron is a 29 y.o. male who presents with Recurrent Sinusitis (Sinus infection, sinus pressure, nausea in the morning x 2 weeks )            Patient is a 29-year-old male presents to urgent care concerned regarding continued nausea.  Patient was recently evaluated in urgent care and started on Augmentin approximately 5 days ago when he had increased sinus pain and pressure, postnasal drainage along with congestion.  In general he does note that he is feeling better and nausea and vomiting in the morning have improved as well however he reports continued nausea at this time.  Denies cough, fevers, neck discomfort or in general headaches.  He reports that in the early morning he will have associated nausea that will last for a little while however during the day he is \"fine\".  He was having the symptoms prior to the Augmentin.        Review of Systems   Constitutional:  Positive for malaise/fatigue. Negative for chills and fever.   HENT:  Positive for congestion and sinus pain. Negative for ear discharge, ear pain and sore throat.         Pos. For ear pressure     Eyes:  Negative for discharge and redness.   Respiratory:  Negative for cough.    Gastrointestinal:  Positive for nausea and vomiting. Negative for abdominal pain and diarrhea.   Genitourinary:  Negative for dysuria.   Musculoskeletal:  Negative for myalgias.   Skin:  Negative for itching and rash.   Neurological:  Positive for headaches. Negative for dizziness.              Objective     /84   Pulse 76   Temp 36.2 °C (97.1 °F)   Resp 17   Ht 1.803 m (5' 11\")   Wt 87.4 kg (192 lb 9.6 oz)   SpO2 96%   BMI 26.86 kg/m²      Physical Exam  Vitals reviewed.   Constitutional:       General: He is not in acute distress.     Appearance: He is well-developed.   HENT:      Head: Normocephalic and atraumatic.      Right Ear: External ear normal.      Left Ear: External ear normal.      Nose: No congestion.      Mouth/Throat: "      Pharynx: Posterior oropharyngeal erythema present.      Comments: Positive cobblestoning to the posterior oropharynx  Eyes:      Conjunctiva/sclera: Conjunctivae normal.      Pupils: Pupils are equal, round, and reactive to light.   Neck:      Trachea: No tracheal deviation.   Cardiovascular:      Rate and Rhythm: Normal rate and regular rhythm.   Pulmonary:      Effort: Pulmonary effort is normal.      Breath sounds: Normal breath sounds.   Musculoskeletal:      Cervical back: Normal range of motion and neck supple.      Comments: Moves all extremities   Skin:     General: Skin is warm.      Findings: No rash.      Comments: No rash to area exposed during the visit today.    Neurological:      Mental Status: He is alert and oriented to person, place, and time.      Coordination: Coordination normal.   Psychiatric:         Mood and Affect: Mood normal.         Behavior: Behavior normal.                                  Assessment & Plan  Sinus pressure         Nausea and vomiting, unspecified vomiting type    Orders:    ondansetron (ZOFRAN ODT) 4 MG TABLET DISPERSIBLE; Take 1 Tablet by mouth every 8 hours as needed for Nausea/Vomiting.           Appears that symptoms have improving as patient has had almost 5 full days of Augmentin encourage patient to continue to complete current course and I will add on Zofran in the morning as it also appears that patient's nausea and vomiting in the mornings have improved as sinus congestion has improved as well-suspect may be due to postnasal drainage causing nausea.  Low threshold for return to clinic if sinus symptoms have improved however nausea and/or vomiting continue.    Differential diagnosis, natural history, supportive care, and indications for immediate follow-up discussed. Side effects of OTC or prescribed medications discussed.      Follow-up as needed if symptoms worsen or fail to improve to PCP, Urgent care or Emergency Room.     I have personally reviewed  prior external notes and test results pertinent to today's visit.  I have independently reviewed and interpreted all diagnostics ordered during this urgent care acute visit.   Discussed management options (risks,benefits, and alternatives to treatment).    The patient and/or guardian demonstrated a good understanding and agreed with the treatment plan. And all questions were answered.  Please note that this dictation was created using voice recognition software. I have made a reasonable attempt to correct obvious errors, but I expect that there are errors of grammar and possibly content that I did not discover before finalizing the note.

## 2025-05-08 ENCOUNTER — OFFICE VISIT (OUTPATIENT)
Dept: URGENT CARE | Facility: PHYSICIAN GROUP | Age: 30
End: 2025-05-08
Payer: COMMERCIAL

## 2025-05-08 VITALS
HEIGHT: 71 IN | BODY MASS INDEX: 26.88 KG/M2 | WEIGHT: 192 LBS | TEMPERATURE: 98.4 F | RESPIRATION RATE: 16 BRPM | OXYGEN SATURATION: 96 % | DIASTOLIC BLOOD PRESSURE: 76 MMHG | HEART RATE: 73 BPM | SYSTOLIC BLOOD PRESSURE: 118 MMHG

## 2025-05-08 DIAGNOSIS — R11.2 NAUSEA AND VOMITING, UNSPECIFIED VOMITING TYPE: ICD-10-CM

## 2025-05-08 PROCEDURE — 3074F SYST BP LT 130 MM HG: CPT | Performed by: FAMILY MEDICINE

## 2025-05-08 PROCEDURE — 3078F DIAST BP <80 MM HG: CPT | Performed by: FAMILY MEDICINE

## 2025-05-08 PROCEDURE — 99213 OFFICE O/P EST LOW 20 MIN: CPT | Performed by: FAMILY MEDICINE

## 2025-05-08 RX ORDER — ONDANSETRON 4 MG/1
4 TABLET, ORALLY DISINTEGRATING ORAL EVERY 8 HOURS PRN
Qty: 20 TABLET | Refills: 0 | Status: SHIPPED | OUTPATIENT
Start: 2025-05-08

## 2025-05-09 ASSESSMENT — ENCOUNTER SYMPTOMS
WEIGHT LOSS: 0
EYE DISCHARGE: 0
MYALGIAS: 0
EYE REDNESS: 0

## 2025-05-09 NOTE — PROGRESS NOTES
"Subjective     Joshua Duron is a 30 y.o. male who presents with Follow-Up (Recheck sinus infection and nausea ,not resolved )            Approximately 1 month intermittent nausea and vomiting.  Occas feels feverish/no measured temp. Usually in morning prior to eating. No abdominal pain.  Recent sinus infection symptoms resolved.  + Marijuana however this is not new and he notes less consumption than his previous.  Zofran has been helpful.  No other aggravating or alleviating factors        Review of Systems   Constitutional:  Negative for malaise/fatigue and weight loss.   Eyes:  Negative for discharge and redness.   Musculoskeletal:  Negative for joint pain and myalgias.   Skin:  Negative for itching and rash.              Objective     /76   Pulse 73   Temp 36.9 °C (98.4 °F) (Temporal)   Resp 16   Ht 1.803 m (5' 11\")   Wt 87.1 kg (192 lb)   SpO2 96%   BMI 26.78 kg/m²      Physical Exam  Constitutional:       General: He is not in acute distress.     Appearance: He is well-developed.   HENT:      Head: Normocephalic and atraumatic.      Nose: Congestion present.   Eyes:      Conjunctiva/sclera: Conjunctivae normal.   Cardiovascular:      Rate and Rhythm: Normal rate and regular rhythm.      Heart sounds: Normal heart sounds. No murmur heard.  Pulmonary:      Effort: Pulmonary effort is normal.      Breath sounds: Normal breath sounds. No wheezing.   Abdominal:      General: Bowel sounds are normal.      Palpations: Abdomen is soft.      Tenderness: There is no abdominal tenderness.   Skin:     General: Skin is warm and dry.      Findings: No rash.   Neurological:      Mental Status: He is alert.            1. Nausea and vomiting, unspecified vomiting type  ondansetron (ZOFRAN ODT) 4 MG TABLET DISPERSIBLE        Differential diagnosis, natural history, supportive care, and indications for immediate follow-up were discussed.     Unclear etiology. No evidence of dehydration or surgical abdomen. "     Follow-up primary care.